# Patient Record
Sex: MALE | Race: WHITE | NOT HISPANIC OR LATINO | Employment: OTHER | URBAN - METROPOLITAN AREA
[De-identification: names, ages, dates, MRNs, and addresses within clinical notes are randomized per-mention and may not be internally consistent; named-entity substitution may affect disease eponyms.]

---

## 2021-07-20 ENCOUNTER — HOSPITAL ENCOUNTER (OUTPATIENT)
Dept: RADIOLOGY | Facility: MEDICAL CENTER | Age: 71
End: 2021-07-20
Attending: FAMILY MEDICINE
Payer: COMMERCIAL

## 2021-07-20 DIAGNOSIS — J44.89 CHRONIC OBSTRUCTIVE BRONCHITIS (HCC): ICD-10-CM

## 2021-07-20 DIAGNOSIS — C61 MALIGNANT NEOPLASM OF PROSTATE (HCC): ICD-10-CM

## 2021-07-20 DIAGNOSIS — N40.1 BENIGN LOCALIZED HYPERPLASIA OF PROSTATE WITH URINARY OBSTRUCTION: ICD-10-CM

## 2021-07-20 DIAGNOSIS — N13.8 BENIGN LOCALIZED HYPERPLASIA OF PROSTATE WITH URINARY OBSTRUCTION: ICD-10-CM

## 2021-07-20 PROCEDURE — A9552 F18 FDG: HCPCS

## 2021-10-28 ENCOUNTER — HOSPITAL ENCOUNTER (OUTPATIENT)
Dept: RADIOLOGY | Facility: MEDICAL CENTER | Age: 71
End: 2021-10-28
Payer: COMMERCIAL

## 2021-10-28 PROBLEM — C61 PROSTATE CANCER (HCC): Status: ACTIVE | Noted: 2021-10-28

## 2021-11-01 ENCOUNTER — HOSPITAL ENCOUNTER (OUTPATIENT)
Dept: RADIATION ONCOLOGY | Facility: MEDICAL CENTER | Age: 71
End: 2021-11-30
Attending: RADIOLOGY
Payer: COMMERCIAL

## 2021-11-01 VITALS
TEMPERATURE: 98.4 F | HEART RATE: 69 BPM | OXYGEN SATURATION: 96 % | SYSTOLIC BLOOD PRESSURE: 130 MMHG | DIASTOLIC BLOOD PRESSURE: 77 MMHG

## 2021-11-01 DIAGNOSIS — C61 PROSTATE CANCER (HCC): ICD-10-CM

## 2021-11-01 PROCEDURE — 99205 OFFICE O/P NEW HI 60 MIN: CPT | Performed by: RADIOLOGY

## 2021-11-01 PROCEDURE — 99214 OFFICE O/P EST MOD 30 MIN: CPT

## 2021-11-01 ASSESSMENT — PAIN SCALES - GENERAL: PAINLEVEL: NO PAIN

## 2021-11-01 NOTE — CONSULTS
RADIATION ONCOLOGY CONSULT    DATE OF SERVICE: 11/1/2021    IDENTIFICATION:   Adenocarcinoma the prostate, initial diagnosis 2018 under active surveillance, PSA of 5.02 at presentation Fernando score 3+3 equal 6 clinical T1c.  Current PSA 5.97, [place] score 4+3 equal 7 in 1 core biopsy and Fernando 3+4 equal 7 in 2 core biopsies, clinical T1c    HISTORY OF PRESENT ILLNESS: I had the pleasure of seeing Mr. Carcamo today in consultation at the request of Dr. Sanchez for his prostate cancer.  Patient is a 71-year-old gentleman whose history of prostate cancer dates back to 2018 when he had a elevation in his PSA to 5.02.  He did not have any nodularity or abnormality on digital rectal exam at that time.  He had a biopsy that showed Fernando 3+3 equal 6 disease.  He was on an active surveillance prostate clinical trial.  He remained stable with a PSA between 5-7 during this time..  His restaging staging scans had been normal.  During Covid he was lost to active surveillance and was on watchful waiting.  Unfortunately he had an abnormal scan that question the possibility of liver and bone mets.  However histologically these were found to be not malignant.  He did however undergo a repeat biopsy that now showed [place] 3+4 equal 7 disease in the right apex and right mid and Fernando 4+3 equal 7 disease in the right base.  In total he had 3 out of 12 core biopsies positive.  Therefore he is being sent for further consideration of active therapy.    PAST MEDICAL HISTORY:   Past Medical History:   Diagnosis Date   • COPD (chronic obstructive pulmonary disease) (HCC)    • Malignant neoplasm of prostate (HCC)        PAST SURGICAL HISTORY:  History reviewed. No pertinent surgical history.    CURRENT MEDICATIONS:  No current outpatient medications on file.     No current facility-administered medications for this encounter.       ALLERGIES:    Patient has no known allergies.    FAMILY HISTORY:    Family History   Problem Relation Age  of Onset   • Cancer Neg Hx        SOCIAL HISTORY:    Social History     Tobacco Use   • Smoking status: Former Smoker   • Smokeless tobacco: Never Used   • Tobacco comment: quit 4yrs ago    Substance Use Topics   • Alcohol use: Yes     Comment: beers daily   • Drug use: Not on file       Patient is retired from Work he had [employer] service  Lives with: Daughter and son-in-law in [place]    REVIEW OF SYSTEMS:  A complete review of systems was completed in patient's chart on 11/1/2021.  All are negative with relationship to this diagnosis with the exception of:  Patient states he has had lifelong increased frequency and urgency of urination and does not exhibit any new change, he did not fill out his sexual health inventory as he is not sexually active, he denies any areas of bony tenderness or deformity    PHYSICAL EXAM:    Vitals:    11/01/21 1006   BP: 130/77   Pulse: 69   Temp: 36.9 °C (98.4 °F)   SpO2: 96%   Pain Score: No pain      0= Fully active, able to carry on all pre-disease performance without restriction.    PAIN:  0  GENERAL: No apparent distress.  HEENT:  Pupils are equal, round, and reactive to light.  Extraocular muscles   are intact. Sclerae nonicteric.  Conjunctivae pink.  Oral cavity, tongue   protrudes midline.   NECK:  Supple without evidence of thyromegaly.  NODES:  No peripheral adenopathy of the neck, supraclavicular fossa or axillae   bilaterally.  LUNGS:  Clear to ascultation bilaterally   HEART:  Regular rate and rhythm.  No murmur appreciated  ABDOMEN:  Soft. No evidence of hepatosplenomegaly.  Positive bowel sounds.  RECTAL: No suspicious nodules or lesions  EXTREMITIES:  Without Edema.  NEUROLOGIC:  Cranial nerves II through XII were intact. Normal stance and gait motor and sensory grossly within normal limits    IPSS:  IN THE PAST MONTH:     1.  Incomplete Emptying: How often have you had the sensation of not emptying your bladder?  3 = About half the time    2.  Frequency: How  often have you had to urinate less than every two hours? 5 = Almost always    3.  Intermittency: How often have you found you stopped and started again several times when you urinated? 4 = More than half the time    4.  Urgency: How often have you found it difficult to postpone urination? 4 = More than half the time    5.  Weak Stream: How often have you had a weak urinary stream? 2 = Less than half the time    6.  Straining: How often have you had to strain to start urination? 1 = Less that 1 in 5 times    7.  Nocturia: How many times did you typically get up at night to urinate? 5 = 5 times    TOTAL: 24 20 - 35 = Severe    QUALITY OF LIFE DUE TO URINARY SYMPTOMS:     If you were to spend the rest of your life with your urinary condition just the way it is now, how would you feel about that? 3 = Mixed        IMPRESSION:    Adenocarcinoma the prostate, initial diagnosis 2018 under active surveillance, PSA of 5.02 at presentation [place] score 3+3 equal 6 clinical T1c.  Current PSA 5.97, [place] score 4+3 equal 7 in 1 core biopsy and [place] 3+4 equal 7 in 2 core biopsies, clinical T1c    RECOMMENDATIONS:   I discussed the diagnosis, prognosis, and treatment options over a 1 hr 5 min time period, 95% of that time dedicated to ongoing treatment management.  I discussed with the patient and his son-in-law the importance of the changes in his disease while on active surveillance.  He would now fit into the unfavorable intermediate risk grouping due to his Fernando 4+3 component.  We discussed the data presented at [facility] this last week.  The update from NRG trial 0815.  This was a trial and intermediate risk prostate cancer testing definitive radiation with or without 6 months of androgen deprivation therapy.  Although overall survival was not improved, there was improvement in prostate cancer specific survival, distant metastasis, and biochemical control.  Therefore I recommended short course androgen deprivation therapy  in combination with external beam radiation.  Next we discussed 3 various fractionation regimens for prostate cancer.  Conventional fractionation with 40 fractions, hypofractionated radiation with 26 fractions, and 5 fractions stereotactic radiosurgery.  He would be a candidate for each of these as well.  I discussed with the patient that with each step in convenience in delivery there is a slight increase in side effect profile.  The side effects have largely been relegated to the acute setting and have largely dissipated by 3-month timing.  Without any measurable difference at that time sequence.  Certainly the largest step in acute side effects does come with the stereotactic radiosurgery approach.  Given these variables as well his his distance from travel he felt that the hypofractionated 26 fraction approach would likely fit his needs.  He is also open to androgen deprivation therapy.  [facility].  He was given a simulation for next Monday at 11 AM.      We discussed the risks, benefits and side effects of treatment and the patient is amenable to treatment.  If patient has any questions or concerns, he should feel free to contact me.    Thank you for the opportunity to participate in his care.  If any questions or comments, please do not hesitate in calling.

## 2021-11-01 NOTE — CT SIMULATION
PATIENT NAME Oliver Carcamo   PRIMARY PHYSICIAN Jovanna Morales [id number]   REFERRING PHYSICIAN Bettie Sanchez M.D. 1950     Prostate cancer (HCC)  Staging form: Prostate, AJCC 8th Edition  - Clinical: Stage IIC (cT1c, cN0, cM0, PSA: 6, Grade Group: 3) - Signed by Cyril Vega M.D. on 10/28/2021  Prostate specific antigen (PSA) range: Less than 10  Crozier score: 7  Histologic grading system: 5 grade system         Treatment Planning CT Simulation      Order Questions     Question Answer Comment    Is this for a new course of treatment? Yes     Is this an Addendum? No     Implanted Device/Pacemaker No     Simulation Status Initial     Treatment Site Prostate     Laterality None     Treatment Technique IMRT     Other Technique(s) IMRT     Treatment Pattern/Frequency Daily     Concurrent Chemotherapy No     CT Technique 3D     Slice Thickness 3mm     Scan Extent Pelvis     Bowel Preparation Yes     Treatment Device(s) Vac Will     Patient Attire Gown     Patient Position Supine     Patient Orientation Head First     Arm Position On Chest     Bladder Full     Treatment Machine No preference     Treatment Image Guidance CBCT     Frequency (CBCT) Daily     Image Guidance Match PTV - Soft Tissue Prostate    Other Orders Weekly Physics Check

## 2021-11-01 NOTE — NON-PROVIDER
Patient was seen today in clinic with Dr. Vega for Consult.  Vitals signs and weight were obtained and pain assessment was completed.  Allergies and medications were reviewed with the patient.  Toxicities of treatment assessed.     Vitals/Pain:  Vitals:    11/01/21 1006   BP: 130/77   Pulse: 69   Temp: 36.9 °C (98.4 °F)   SpO2: 96%   Pain Score: No pain        Allergies:   Patient has no known allergies.    Current Medications:  No current outpatient medications on file.     No current facility-administered medications for this encounter.         PCP:  Andrew Nur, Med Ass't

## 2021-11-08 ENCOUNTER — HOSPITAL ENCOUNTER (OUTPATIENT)
Dept: RADIATION ONCOLOGY | Facility: MEDICAL CENTER | Age: 71
End: 2021-11-08
Payer: COMMERCIAL

## 2021-11-08 PROCEDURE — 77290 THER RAD SIMULAJ FIELD CPLX: CPT

## 2021-11-08 PROCEDURE — 77334 RADIATION TREATMENT AID(S): CPT

## 2021-11-08 PROCEDURE — 77334 RADIATION TREATMENT AID(S): CPT | Mod: 26 | Performed by: RADIOLOGY

## 2021-11-08 PROCEDURE — 77263 THER RADIOLOGY TX PLNG CPLX: CPT | Performed by: RADIOLOGY

## 2021-11-09 ENCOUNTER — PATIENT OUTREACH (OUTPATIENT)
Dept: OTHER | Facility: MEDICAL CENTER | Age: 71
End: 2021-11-09

## 2021-11-09 DIAGNOSIS — Z65.8 PSYCHOSOCIAL DISTRESS: ICD-10-CM

## 2021-11-10 ENCOUNTER — HOSPITAL ENCOUNTER (OUTPATIENT)
Dept: RADIOLOGY | Facility: MEDICAL CENTER | Age: 71
End: 2021-11-10
Payer: COMMERCIAL

## 2021-11-10 PROCEDURE — 77301 RADIOTHERAPY DOSE PLAN IMRT: CPT | Mod: 26 | Performed by: RADIOLOGY

## 2021-11-10 PROCEDURE — 77338 DESIGN MLC DEVICE FOR IMRT: CPT | Performed by: RADIOLOGY

## 2021-11-10 PROCEDURE — 77301 RADIOTHERAPY DOSE PLAN IMRT: CPT | Performed by: RADIOLOGY

## 2021-11-10 PROCEDURE — 77300 RADIATION THERAPY DOSE PLAN: CPT | Mod: 26 | Performed by: RADIOLOGY

## 2021-11-10 PROCEDURE — 77300 RADIATION THERAPY DOSE PLAN: CPT | Performed by: RADIOLOGY

## 2021-11-10 PROCEDURE — 77338 DESIGN MLC DEVICE FOR IMRT: CPT | Mod: 26 | Performed by: RADIOLOGY

## 2021-11-10 NOTE — PROGRESS NOTES
Call placed to patient.  Introduced self and explained role of navigator.  Pt reports understands current plan of care.  He plans to drive self, but also has additional resources for transportation if needed.  Pt asking about discount lodging. Pt aware of [facility] resources.   Reviewed lodging list as well as [facility] guest house as resource if needs to stay in [place].  Contact information provided.  Pt denies other questions or needs at this time.  Pt scored self 3/10 on oncology distress scale.  Pt saying just general stress and family he lives with can make things more stressful at times.  Pt grateful for call, contact information for navigator provided.

## 2021-11-15 ENCOUNTER — HOSPITAL ENCOUNTER (OUTPATIENT)
Dept: RADIATION ONCOLOGY | Facility: MEDICAL CENTER | Age: 71
End: 2021-11-15
Payer: COMMERCIAL

## 2021-11-15 LAB
CHEMOTHERAPY INFUSION START DATE: NORMAL
CHEMOTHERAPY RECORDS: 2.7
CHEMOTHERAPY RECORDS: 7020
CHEMOTHERAPY RECORDS: NORMAL
CHEMOTHERAPY RX CANCER: NORMAL
DATE 1ST CHEMO CANCER: NORMAL
RAD ONC ARIA COURSE LAST TREATMENT DATE: NORMAL
RAD ONC ARIA COURSE TREATMENT ELAPSED DAYS: NORMAL
RAD ONC ARIA REFERENCE POINT DOSAGE GIVEN TO DATE: 2.7
RAD ONC ARIA REFERENCE POINT DOSAGE GIVEN TO DATE: 2.8
RAD ONC ARIA REFERENCE POINT ID: NORMAL
RAD ONC ARIA REFERENCE POINT ID: NORMAL
RAD ONC ARIA REFERENCE POINT SESSION DOSAGE GIVEN: 2.7
RAD ONC ARIA REFERENCE POINT SESSION DOSAGE GIVEN: 2.8

## 2021-11-15 PROCEDURE — 77385 HCHG IMRT DELIVERY SIMPLE: CPT | Performed by: RADIOLOGY

## 2021-11-15 PROCEDURE — 77280 THER RAD SIMULAJ FIELD SMPL: CPT | Performed by: RADIOLOGY

## 2021-11-16 ENCOUNTER — HOSPITAL ENCOUNTER (OUTPATIENT)
Dept: RADIATION ONCOLOGY | Facility: MEDICAL CENTER | Age: 71
End: 2021-11-16
Payer: COMMERCIAL

## 2021-11-16 LAB
CHEMOTHERAPY INFUSION START DATE: NORMAL
CHEMOTHERAPY RECORDS: 2.7
CHEMOTHERAPY RECORDS: 7020
CHEMOTHERAPY RECORDS: NORMAL
CHEMOTHERAPY RX CANCER: NORMAL
DATE 1ST CHEMO CANCER: NORMAL
RAD ONC ARIA COURSE LAST TREATMENT DATE: NORMAL
RAD ONC ARIA COURSE TREATMENT ELAPSED DAYS: NORMAL
RAD ONC ARIA REFERENCE POINT DOSAGE GIVEN TO DATE: 5.4
RAD ONC ARIA REFERENCE POINT DOSAGE GIVEN TO DATE: 5.61
RAD ONC ARIA REFERENCE POINT ID: NORMAL
RAD ONC ARIA REFERENCE POINT ID: NORMAL
RAD ONC ARIA REFERENCE POINT SESSION DOSAGE GIVEN: 2.7
RAD ONC ARIA REFERENCE POINT SESSION DOSAGE GIVEN: 2.8

## 2021-11-16 PROCEDURE — 77014 PR CT GUIDANCE PLACEMENT RAD THERAPY FIELDS: CPT | Mod: 26 | Performed by: RADIOLOGY

## 2021-11-16 PROCEDURE — 77385 HCHG IMRT DELIVERY SIMPLE: CPT | Performed by: RADIOLOGY

## 2021-11-17 ENCOUNTER — HOSPITAL ENCOUNTER (OUTPATIENT)
Dept: RADIATION ONCOLOGY | Facility: MEDICAL CENTER | Age: 71
End: 2021-11-17
Payer: COMMERCIAL

## 2021-11-17 VITALS — OXYGEN SATURATION: 98 % | TEMPERATURE: 97.6 F | HEART RATE: 90 BPM

## 2021-11-17 LAB
CHEMOTHERAPY INFUSION START DATE: NORMAL
CHEMOTHERAPY RECORDS: 2.7
CHEMOTHERAPY RECORDS: 7020
CHEMOTHERAPY RECORDS: NORMAL
CHEMOTHERAPY RX CANCER: NORMAL
DATE 1ST CHEMO CANCER: NORMAL
RAD ONC ARIA COURSE LAST TREATMENT DATE: NORMAL
RAD ONC ARIA COURSE TREATMENT ELAPSED DAYS: NORMAL
RAD ONC ARIA REFERENCE POINT DOSAGE GIVEN TO DATE: 8.1
RAD ONC ARIA REFERENCE POINT DOSAGE GIVEN TO DATE: 8.41
RAD ONC ARIA REFERENCE POINT ID: NORMAL
RAD ONC ARIA REFERENCE POINT ID: NORMAL
RAD ONC ARIA REFERENCE POINT SESSION DOSAGE GIVEN: 2.7
RAD ONC ARIA REFERENCE POINT SESSION DOSAGE GIVEN: 2.8

## 2021-11-17 PROCEDURE — 77336 RADIATION PHYSICS CONSULT: CPT | Performed by: RADIOLOGY

## 2021-11-17 PROCEDURE — 77385 HCHG IMRT DELIVERY SIMPLE: CPT | Performed by: RADIOLOGY

## 2021-11-17 PROCEDURE — 77014 PR CT GUIDANCE PLACEMENT RAD THERAPY FIELDS: CPT | Mod: 26 | Performed by: RADIOLOGY

## 2021-11-17 ASSESSMENT — PAIN SCALES - GENERAL: PAINLEVEL: NO PAIN

## 2021-11-17 NOTE — ON TREATMENT VISIT
ON TREATMENT NOTE  RADIATION ONCOLOGY DEPARTMENT    Patient name:  Oliver Carcamo    Primary Physician:  Jovanna Morales D.O. MRN: [id number]  CSN: [id number]   Referring physician:  Bettie Sanchez M.D. : 1950, 71 y.o.     ENCOUNTER DATE:  21    DIAGNOSIS:    Prostate cancer (HCC)  Staging form: Prostate, AJCC 8th Edition  - Clinical: Stage IIC (cT1c, cN0, cM0, PSA: 6, Grade Group: 3) - Signed by Cyril Vega M.D. on 10/28/2021  Prostate specific antigen (PSA) range: Less than 10  Fernando score: 7  Histologic grading system: 5 grade system      TREATMENT SUMMARY:  [facility] Treatment Information        Some values may be hidden. Unless noted otherwise, only the newest values recorded on each date are displayed.         [facility] Treatment Summary 21   Course First Treatment Date 11/15/2021   Course Last Treatment Date 2021   Prostate Plan from Course C1_Prostate   Fraction 3 of 26   Elapsed Course Days 2 @ [id number]   Prescribed Fraction Dose 270 cGy   Prescribed Total Dose 7,020 cGy   Prostate Reference Point from Course C1_Prostate   Elapsed Course Days 2 @ [id number]   Session Dose 270 cGy   Total Dose 810 cGy   Prostate CP Reference Point from Course C1_Prostate   Elapsed Course Days 2 @ [id number]   Session Dose 280 cGy   Total Dose 841 cGy              SUBJECTIVE:   Patient is doing well.  He does not any changes he would attribute to his radiation.    VITAL SIGNS:    Encounter Vitals  Temperature: 36.4 °C (97.6 °F)  Pulse: 90  Pulse Oximetry: 98 %  Pain Score: No pain  Pain Assessment 2021   Pain Score 0 0          PHYSICAL EXAM:  Physical Exam  Genitourinary:     Comments: Normal         TOXICITY  Toxicity Assessment 2021   Toxicity Assessment Male Pelvis   Fatigue (lethargy, malaise, asthenia) None   Radiation Dermatitis None   Anorexia None   Colitis None   Constipation None   Dehydration None   Diarrhea w/o Colostomy None   Flatulence None   Nausea  None   Proctitis None   Vomiting None   RT - Pain due to RT None   Tumor Pain (onset or exacerbation of tumor pain due to treatment) None   Dysuria (painful urination) None   Urinary Frequency Hourly or more with urgency, or requiring catheter   Urinary Urgency None   Bladder Spasms Absent   Incontinence None   Urinary Retention Normal         IMPRESSION:  Cancer Staging  Prostate cancer (HCC)  Staging form: Prostate, AJCC 8th Edition  - Clinical: Stage IIC (cT1c, cN0, cM0, PSA: 6, Grade Group: 3) - Signed by Cyril Vega M.D. on 10/28/2021      PLAN:  No change in treatment plan    Disposition:  Treatment plan reviewed. Questions answered. Continue therapy outlined.     Cyril Vega M.D.    No orders of the defined types were placed in this encounter.

## 2021-11-18 ENCOUNTER — HOSPITAL ENCOUNTER (OUTPATIENT)
Dept: RADIATION ONCOLOGY | Facility: MEDICAL CENTER | Age: 71
End: 2021-11-18
Payer: COMMERCIAL

## 2021-11-18 LAB
CHEMOTHERAPY INFUSION START DATE: NORMAL
CHEMOTHERAPY RECORDS: 2.7
CHEMOTHERAPY RECORDS: 7020
CHEMOTHERAPY RECORDS: NORMAL
CHEMOTHERAPY RX CANCER: NORMAL
DATE 1ST CHEMO CANCER: NORMAL
RAD ONC ARIA COURSE LAST TREATMENT DATE: NORMAL
RAD ONC ARIA COURSE TREATMENT ELAPSED DAYS: NORMAL
RAD ONC ARIA REFERENCE POINT DOSAGE GIVEN TO DATE: 10.8
RAD ONC ARIA REFERENCE POINT DOSAGE GIVEN TO DATE: 11.21
RAD ONC ARIA REFERENCE POINT ID: NORMAL
RAD ONC ARIA REFERENCE POINT ID: NORMAL
RAD ONC ARIA REFERENCE POINT SESSION DOSAGE GIVEN: 2.7
RAD ONC ARIA REFERENCE POINT SESSION DOSAGE GIVEN: 2.8

## 2021-11-18 PROCEDURE — 77385 HCHG IMRT DELIVERY SIMPLE: CPT | Performed by: RADIOLOGY

## 2021-11-18 PROCEDURE — 77014 PR CT GUIDANCE PLACEMENT RAD THERAPY FIELDS: CPT | Mod: 26 | Performed by: RADIOLOGY

## 2021-11-19 ENCOUNTER — HOSPITAL ENCOUNTER (OUTPATIENT)
Dept: RADIATION ONCOLOGY | Facility: MEDICAL CENTER | Age: 71
End: 2021-11-19
Payer: COMMERCIAL

## 2021-11-19 LAB
CHEMOTHERAPY INFUSION START DATE: NORMAL
CHEMOTHERAPY RECORDS: 2.7
CHEMOTHERAPY RECORDS: 7020
CHEMOTHERAPY RECORDS: NORMAL
CHEMOTHERAPY RX CANCER: NORMAL
DATE 1ST CHEMO CANCER: NORMAL
RAD ONC ARIA COURSE LAST TREATMENT DATE: NORMAL
RAD ONC ARIA COURSE TREATMENT ELAPSED DAYS: NORMAL
RAD ONC ARIA REFERENCE POINT DOSAGE GIVEN TO DATE: 13.5
RAD ONC ARIA REFERENCE POINT DOSAGE GIVEN TO DATE: 14.02
RAD ONC ARIA REFERENCE POINT ID: NORMAL
RAD ONC ARIA REFERENCE POINT ID: NORMAL
RAD ONC ARIA REFERENCE POINT SESSION DOSAGE GIVEN: 2.7
RAD ONC ARIA REFERENCE POINT SESSION DOSAGE GIVEN: 2.8

## 2021-11-19 PROCEDURE — 77385 HCHG IMRT DELIVERY SIMPLE: CPT | Performed by: RADIOLOGY

## 2021-11-19 PROCEDURE — 77427 RADIATION TX MANAGEMENT X5: CPT | Performed by: RADIOLOGY

## 2021-11-19 PROCEDURE — 77014 PR CT GUIDANCE PLACEMENT RAD THERAPY FIELDS: CPT | Mod: 26 | Performed by: RADIOLOGY

## 2021-11-21 ENCOUNTER — HOSPITAL ENCOUNTER (OUTPATIENT)
Dept: RADIATION ONCOLOGY | Facility: MEDICAL CENTER | Age: 71
End: 2021-11-21
Payer: COMMERCIAL

## 2021-11-21 LAB
CHEMOTHERAPY INFUSION START DATE: NORMAL
CHEMOTHERAPY RECORDS: 2.7
CHEMOTHERAPY RECORDS: 7020
CHEMOTHERAPY RECORDS: NORMAL
CHEMOTHERAPY RX CANCER: NORMAL
DATE 1ST CHEMO CANCER: NORMAL
RAD ONC ARIA COURSE LAST TREATMENT DATE: NORMAL
RAD ONC ARIA COURSE TREATMENT ELAPSED DAYS: NORMAL
RAD ONC ARIA REFERENCE POINT DOSAGE GIVEN TO DATE: 16.2
RAD ONC ARIA REFERENCE POINT DOSAGE GIVEN TO DATE: 16.82
RAD ONC ARIA REFERENCE POINT ID: NORMAL
RAD ONC ARIA REFERENCE POINT ID: NORMAL
RAD ONC ARIA REFERENCE POINT SESSION DOSAGE GIVEN: 2.7
RAD ONC ARIA REFERENCE POINT SESSION DOSAGE GIVEN: 2.8

## 2021-11-21 PROCEDURE — 77014 PR CT GUIDANCE PLACEMENT RAD THERAPY FIELDS: CPT | Mod: 26 | Performed by: RADIOLOGY

## 2021-11-21 PROCEDURE — 77385 HCHG IMRT DELIVERY SIMPLE: CPT | Performed by: RADIOLOGY

## 2021-11-22 ENCOUNTER — HOSPITAL ENCOUNTER (OUTPATIENT)
Dept: RADIATION ONCOLOGY | Facility: MEDICAL CENTER | Age: 71
End: 2021-11-22
Payer: COMMERCIAL

## 2021-11-22 LAB
CHEMOTHERAPY INFUSION START DATE: NORMAL
CHEMOTHERAPY RECORDS: 2.7
CHEMOTHERAPY RECORDS: 7020
CHEMOTHERAPY RECORDS: NORMAL
CHEMOTHERAPY RX CANCER: NORMAL
DATE 1ST CHEMO CANCER: NORMAL
RAD ONC ARIA COURSE LAST TREATMENT DATE: NORMAL
RAD ONC ARIA COURSE TREATMENT ELAPSED DAYS: NORMAL
RAD ONC ARIA REFERENCE POINT DOSAGE GIVEN TO DATE: 18.9
RAD ONC ARIA REFERENCE POINT DOSAGE GIVEN TO DATE: 19.62
RAD ONC ARIA REFERENCE POINT ID: NORMAL
RAD ONC ARIA REFERENCE POINT ID: NORMAL
RAD ONC ARIA REFERENCE POINT SESSION DOSAGE GIVEN: 2.7
RAD ONC ARIA REFERENCE POINT SESSION DOSAGE GIVEN: 2.8

## 2021-11-22 PROCEDURE — 77014 PR CT GUIDANCE PLACEMENT RAD THERAPY FIELDS: CPT | Mod: 26 | Performed by: RADIOLOGY

## 2021-11-22 PROCEDURE — 77385 HCHG IMRT DELIVERY SIMPLE: CPT | Performed by: RADIOLOGY

## 2021-11-23 ENCOUNTER — HOSPITAL ENCOUNTER (OUTPATIENT)
Dept: RADIATION ONCOLOGY | Facility: MEDICAL CENTER | Age: 71
End: 2021-11-23
Payer: COMMERCIAL

## 2021-11-23 LAB
CHEMOTHERAPY INFUSION START DATE: NORMAL
CHEMOTHERAPY RECORDS: 2.7
CHEMOTHERAPY RECORDS: 7020
CHEMOTHERAPY RECORDS: NORMAL
CHEMOTHERAPY RX CANCER: NORMAL
DATE 1ST CHEMO CANCER: NORMAL
RAD ONC ARIA COURSE LAST TREATMENT DATE: NORMAL
RAD ONC ARIA COURSE TREATMENT ELAPSED DAYS: NORMAL
RAD ONC ARIA REFERENCE POINT DOSAGE GIVEN TO DATE: 21.6
RAD ONC ARIA REFERENCE POINT DOSAGE GIVEN TO DATE: 22.43
RAD ONC ARIA REFERENCE POINT ID: NORMAL
RAD ONC ARIA REFERENCE POINT ID: NORMAL
RAD ONC ARIA REFERENCE POINT SESSION DOSAGE GIVEN: 2.7
RAD ONC ARIA REFERENCE POINT SESSION DOSAGE GIVEN: 2.8

## 2021-11-23 PROCEDURE — 77336 RADIATION PHYSICS CONSULT: CPT | Performed by: RADIOLOGY

## 2021-11-23 PROCEDURE — 77014 PR CT GUIDANCE PLACEMENT RAD THERAPY FIELDS: CPT | Mod: 26 | Performed by: RADIOLOGY

## 2021-11-23 PROCEDURE — 77385 HCHG IMRT DELIVERY SIMPLE: CPT | Performed by: RADIOLOGY

## 2021-11-24 ENCOUNTER — HOSPITAL ENCOUNTER (OUTPATIENT)
Dept: RADIATION ONCOLOGY | Facility: MEDICAL CENTER | Age: 71
End: 2021-11-24
Payer: COMMERCIAL

## 2021-11-24 VITALS
HEART RATE: 82 BPM | OXYGEN SATURATION: 97 % | SYSTOLIC BLOOD PRESSURE: 120 MMHG | DIASTOLIC BLOOD PRESSURE: 73 MMHG | TEMPERATURE: 97.1 F

## 2021-11-24 LAB
CHEMOTHERAPY INFUSION START DATE: NORMAL
CHEMOTHERAPY RECORDS: 2.7
CHEMOTHERAPY RECORDS: 7020
CHEMOTHERAPY RECORDS: NORMAL
CHEMOTHERAPY RX CANCER: NORMAL
DATE 1ST CHEMO CANCER: NORMAL
RAD ONC ARIA COURSE LAST TREATMENT DATE: NORMAL
RAD ONC ARIA COURSE TREATMENT ELAPSED DAYS: NORMAL
RAD ONC ARIA REFERENCE POINT DOSAGE GIVEN TO DATE: 24.3
RAD ONC ARIA REFERENCE POINT DOSAGE GIVEN TO DATE: 25.23
RAD ONC ARIA REFERENCE POINT ID: NORMAL
RAD ONC ARIA REFERENCE POINT ID: NORMAL
RAD ONC ARIA REFERENCE POINT SESSION DOSAGE GIVEN: 2.7
RAD ONC ARIA REFERENCE POINT SESSION DOSAGE GIVEN: 2.8

## 2021-11-24 PROCEDURE — 77014 PR CT GUIDANCE PLACEMENT RAD THERAPY FIELDS: CPT | Mod: 26 | Performed by: RADIOLOGY

## 2021-11-24 PROCEDURE — 77385 HCHG IMRT DELIVERY SIMPLE: CPT | Performed by: RADIOLOGY

## 2021-11-24 ASSESSMENT — PAIN SCALES - GENERAL: PAINLEVEL: NO PAIN

## 2021-11-24 NOTE — ON TREATMENT VISIT
ON TREATMENT NOTE  RADIATION ONCOLOGY DEPARTMENT    Patient name:  Oliver Carcamo    Primary Physician:  Jovanna Morales D.O. MRN: [id number]  CSN: [id number]   Referring physician:  Bettie Sanchez M.D. : 1950, 71 y.o.     ENCOUNTER DATE:  21    DIAGNOSIS:    Prostate cancer (HCC)  Staging form: Prostate, AJCC 8th Edition  - Clinical: Stage IIC (cT1c, cN0, cM0, PSA: 6, Grade Group: 3) - Signed by Cyril Vega M.D. on 10/28/2021  Prostate specific antigen (PSA) range: Less than 10  Fernando score: 7  Histologic grading system: 5 grade system      TREATMENT SUMMARY:  [facility] Treatment Information        Some values may be hidden. Unless noted otherwise, only the newest values recorded on each date are displayed.         [facility] Treatment Summary 21   Course First Treatment Date 11/15/2021   Course Last Treatment Date 2021   Prostate Plan from Course C1_Prostate   Fraction    Elapsed Course Days 9 @ [id number]   Prescribed Fraction Dose 270 cGy   Prescribed Total Dose 7,020 cGy   Prostate Reference Point from Course C1_Prostate   Elapsed Course Days  @ [id number]   Session Dose 270 cGy   Total Dose 2,430 cGy   Prostate CP Reference Point from Course C1_Prostate   Elapsed Course Days  @    Session Dose 280 cGy   Total Dose 2,523 cGy              SUBJECTIVE:   Patient is doing well.  He does not have any changes he attribute to his radiation.    VITAL SIGNS:    Encounter Vitals  Temperature: 36.2 °C (97.1 °F)  Blood Pressure : 120/73  Pulse: 82  Pulse Oximetry: 97 %  Pain Score: No pain  Pain Assessment 2021   Pain Score 0 0 0          PHYSICAL EXAM:  Physical Exam  Genitourinary:     Comments: Normal         TOXICITY  Toxicity Assessment 2021   Toxicity Assessment Male Pelvis Male Pelvis   Fatigue (lethargy, malaise, asthenia) None None   Radiation Dermatitis None None   Anorexia None None   Colitis None None    Constipation None None   Dehydration None None   Diarrhea w/o Colostomy None None   Flatulence None None   Nausea None None   Proctitis None None   Vomiting None None   RT - Pain due to RT None None   Tumor Pain (onset or exacerbation of tumor pain due to treatment) None None   Dysuria (painful urination) None None   Urinary Frequency Normal Hourly or more with urgency, or requiring catheter   Urinary Urgency None None   Bladder Spasms Absent Absent   Incontinence None None   Urinary Retention Normal Normal         IMPRESSION:  Cancer Staging  Prostate cancer (HCC)  Staging form: Prostate, AJCC 8th Edition  - Clinical: Stage IIC (cT1c, cN0, cM0, PSA: 6, Grade Group: 3) - Signed by Cyril Vega M.D. on 10/28/2021      PLAN:  No change in treatment plan    Disposition:  Treatment plan reviewed. Questions answered. Continue therapy outlined.     Cyril Vega M.D.    No orders of the defined types were placed in this encounter.

## 2021-11-30 ENCOUNTER — HOSPITAL ENCOUNTER (OUTPATIENT)
Dept: RADIATION ONCOLOGY | Facility: MEDICAL CENTER | Age: 71
End: 2021-11-30
Payer: COMMERCIAL

## 2021-11-30 LAB
CHEMOTHERAPY INFUSION START DATE: NORMAL
CHEMOTHERAPY RECORDS: 2.7
CHEMOTHERAPY RECORDS: 7020
CHEMOTHERAPY RECORDS: NORMAL
CHEMOTHERAPY RX CANCER: NORMAL
DATE 1ST CHEMO CANCER: NORMAL
RAD ONC ARIA COURSE LAST TREATMENT DATE: NORMAL
RAD ONC ARIA COURSE TREATMENT ELAPSED DAYS: NORMAL
RAD ONC ARIA REFERENCE POINT DOSAGE GIVEN TO DATE: 27
RAD ONC ARIA REFERENCE POINT DOSAGE GIVEN TO DATE: 28.03
RAD ONC ARIA REFERENCE POINT ID: NORMAL
RAD ONC ARIA REFERENCE POINT ID: NORMAL
RAD ONC ARIA REFERENCE POINT SESSION DOSAGE GIVEN: 2.7
RAD ONC ARIA REFERENCE POINT SESSION DOSAGE GIVEN: 2.8

## 2021-11-30 PROCEDURE — 77385 HCHG IMRT DELIVERY SIMPLE: CPT | Performed by: RADIOLOGY

## 2021-11-30 PROCEDURE — 77427 RADIATION TX MANAGEMENT X5: CPT | Performed by: RADIOLOGY

## 2021-11-30 PROCEDURE — 77014 PR CT GUIDANCE PLACEMENT RAD THERAPY FIELDS: CPT | Mod: 26 | Performed by: RADIOLOGY

## 2021-12-01 ENCOUNTER — HOSPITAL ENCOUNTER (OUTPATIENT)
Dept: RADIATION ONCOLOGY | Facility: MEDICAL CENTER | Age: 71
End: 2021-12-01

## 2021-12-01 ENCOUNTER — HOSPITAL ENCOUNTER (OUTPATIENT)
Dept: RADIATION ONCOLOGY | Facility: MEDICAL CENTER | Age: 71
End: 2021-12-31
Attending: RADIOLOGY
Payer: COMMERCIAL

## 2021-12-01 VITALS
DIASTOLIC BLOOD PRESSURE: 83 MMHG | HEART RATE: 94 BPM | TEMPERATURE: 98.1 F | OXYGEN SATURATION: 98 % | SYSTOLIC BLOOD PRESSURE: 111 MMHG

## 2021-12-01 LAB
CHEMOTHERAPY INFUSION START DATE: NORMAL
CHEMOTHERAPY RECORDS: 2.7
CHEMOTHERAPY RECORDS: 7020
CHEMOTHERAPY RECORDS: NORMAL
CHEMOTHERAPY RX CANCER: NORMAL
DATE 1ST CHEMO CANCER: NORMAL
RAD ONC ARIA COURSE LAST TREATMENT DATE: NORMAL
RAD ONC ARIA COURSE TREATMENT ELAPSED DAYS: NORMAL
RAD ONC ARIA REFERENCE POINT DOSAGE GIVEN TO DATE: 29.7
RAD ONC ARIA REFERENCE POINT DOSAGE GIVEN TO DATE: 30.83
RAD ONC ARIA REFERENCE POINT ID: NORMAL
RAD ONC ARIA REFERENCE POINT ID: NORMAL
RAD ONC ARIA REFERENCE POINT SESSION DOSAGE GIVEN: 2.7
RAD ONC ARIA REFERENCE POINT SESSION DOSAGE GIVEN: 2.8

## 2021-12-01 PROCEDURE — 77385 HCHG IMRT DELIVERY SIMPLE: CPT | Performed by: RADIOLOGY

## 2021-12-01 PROCEDURE — 77014 PR CT GUIDANCE PLACEMENT RAD THERAPY FIELDS: CPT | Mod: 26 | Performed by: RADIOLOGY

## 2021-12-01 ASSESSMENT — PAIN SCALES - GENERAL: PAINLEVEL: NO PAIN

## 2021-12-01 NOTE — ON TREATMENT VISIT
ON TREATMENT NOTE  RADIATION ONCOLOGY DEPARTMENT    Patient name:  Oliver Carcamo    Primary Physician:  Jovanna Morales D.O. MRN: [id number]  CSN: [id number]   Referring physician:  No ref. provider found : 1950, 71 y.o.     ENCOUNTER DATE:  21    DIAGNOSIS:    Prostate cancer (HCC)  Staging form: Prostate, AJCC 8th Edition  - Clinical: Stage IIC (cT1c, cN0, cM0, PSA: 6, Grade Group: 3) - Signed by Cyril Vega M.D. on 10/28/2021  Prostate specific antigen (PSA) range: Less than 10  Fernando score: 7  Histologic grading system: 5 grade system      TREATMENT SUMMARY:  [facility] Treatment Information        Some values may be hidden. Unless noted otherwise, only the newest values recorded on each date are displayed.         [facility]ria Treatment Summary 21   Course First Treatment Date 11/15/2021   Course Last Treatment Date 2021   Prostate Plan from Course C1_Prostate   Fraction    Elapsed Course Days 16 @    Prescribed Fraction Dose 270 cGy   Prescribed Total Dose 7,020 cGy   Prostate Reference Point from Course C1_Prostate   Elapsed Course Days 16 @ [id number]   Session Dose 270 cGy   Total Dose 2,970 cGy   Prostate CP Reference Point from Course C1_Prostate   Elapsed Course Days 16 @    Session Dose 280 cGy   Total Dose 3,083 cGy              SUBJECTIVE:   Patient is doing well.  He is still experiencing slight increase in his urgency and frequency of urinary habits.  He continues on his Flomax.    VITAL SIGNS:    Encounter Vitals  Temperature: 36.7 °C (98.1 °F)  Blood Pressure : 111/83  Pulse: 94  Pulse Oximetry: 98 %  Pain Score: No pain  Pain Assessment 2021   Pain Score 0 0 0 0          PHYSICAL EXAM:  Physical Exam  Genitourinary:     Comments: Normal         TOXICITY  Toxicity Assessment 2021   Toxicity Assessment Male Pelvis Male Pelvis Male Pelvis   Fatigue (lethargy, malaise, asthenia)  None None None   Radiation Dermatitis None None None   Anorexia None None None   Colitis None None None   Constipation None None None   Dehydration None None None   Diarrhea w/o Colostomy None None None   Flatulence None None None   Nausea None None None   Proctitis None None None   Vomiting None None None   RT - Pain due to RT None None None   Tumor Pain (onset or exacerbation of tumor pain due to treatment) - None None   Dysuria (painful urination) None None None   Urinary Frequency Increase in frequency up to 2x normal Normal Hourly or more with urgency, or requiring catheter   Urinary Urgency Present None None   Bladder Spasms Absent Absent Absent   Incontinence None None None   Urinary Retention Normal Normal Normal         IMPRESSION:  Cancer Staging  Prostate cancer (HCC)  Staging form: Prostate, AJCC 8th Edition  - Clinical: Stage IIC (cT1c, cN0, cM0, PSA: 6, Grade Group: 3) - Signed by Cyril Vega M.D. on 10/28/2021      PLAN:  No change in treatment plan    Disposition:  Treatment plan reviewed. Questions answered. Continue therapy outlined.     Cyril Vega M.D.    No orders of the defined types were placed in this encounter.

## 2021-12-02 ENCOUNTER — HOSPITAL ENCOUNTER (OUTPATIENT)
Dept: RADIATION ONCOLOGY | Facility: MEDICAL CENTER | Age: 71
End: 2021-12-02
Payer: COMMERCIAL

## 2021-12-02 LAB
CHEMOTHERAPY INFUSION START DATE: NORMAL
CHEMOTHERAPY RECORDS: 2.7
CHEMOTHERAPY RECORDS: 7020
CHEMOTHERAPY RECORDS: NORMAL
CHEMOTHERAPY RX CANCER: NORMAL
DATE 1ST CHEMO CANCER: NORMAL
RAD ONC ARIA COURSE LAST TREATMENT DATE: NORMAL
RAD ONC ARIA COURSE TREATMENT ELAPSED DAYS: NORMAL
RAD ONC ARIA REFERENCE POINT DOSAGE GIVEN TO DATE: 32.4
RAD ONC ARIA REFERENCE POINT DOSAGE GIVEN TO DATE: 33.64
RAD ONC ARIA REFERENCE POINT ID: NORMAL
RAD ONC ARIA REFERENCE POINT ID: NORMAL
RAD ONC ARIA REFERENCE POINT SESSION DOSAGE GIVEN: 2.7
RAD ONC ARIA REFERENCE POINT SESSION DOSAGE GIVEN: 2.8

## 2021-12-02 PROCEDURE — 77014 PR CT GUIDANCE PLACEMENT RAD THERAPY FIELDS: CPT | Mod: 26 | Performed by: RADIOLOGY

## 2021-12-02 PROCEDURE — 77385 HCHG IMRT DELIVERY SIMPLE: CPT | Performed by: RADIOLOGY

## 2021-12-03 ENCOUNTER — HOSPITAL ENCOUNTER (OUTPATIENT)
Dept: RADIATION ONCOLOGY | Facility: MEDICAL CENTER | Age: 71
End: 2021-12-03
Payer: COMMERCIAL

## 2021-12-03 LAB
CHEMOTHERAPY INFUSION START DATE: NORMAL
CHEMOTHERAPY RECORDS: 2.7
CHEMOTHERAPY RECORDS: 7020
CHEMOTHERAPY RECORDS: NORMAL
CHEMOTHERAPY RX CANCER: NORMAL
DATE 1ST CHEMO CANCER: NORMAL
RAD ONC ARIA COURSE LAST TREATMENT DATE: NORMAL
RAD ONC ARIA COURSE TREATMENT ELAPSED DAYS: NORMAL
RAD ONC ARIA REFERENCE POINT DOSAGE GIVEN TO DATE: 35.1
RAD ONC ARIA REFERENCE POINT DOSAGE GIVEN TO DATE: 36.44
RAD ONC ARIA REFERENCE POINT ID: NORMAL
RAD ONC ARIA REFERENCE POINT ID: NORMAL
RAD ONC ARIA REFERENCE POINT SESSION DOSAGE GIVEN: 2.7
RAD ONC ARIA REFERENCE POINT SESSION DOSAGE GIVEN: 2.8

## 2021-12-03 PROCEDURE — 77385 HCHG IMRT DELIVERY SIMPLE: CPT | Performed by: RADIOLOGY

## 2021-12-03 PROCEDURE — 77336 RADIATION PHYSICS CONSULT: CPT | Performed by: RADIOLOGY

## 2021-12-03 PROCEDURE — 77014 PR CT GUIDANCE PLACEMENT RAD THERAPY FIELDS: CPT | Mod: 26 | Performed by: RADIOLOGY

## 2021-12-06 ENCOUNTER — HOSPITAL ENCOUNTER (OUTPATIENT)
Dept: RADIATION ONCOLOGY | Facility: MEDICAL CENTER | Age: 71
End: 2021-12-06
Payer: COMMERCIAL

## 2021-12-06 LAB
CHEMOTHERAPY INFUSION START DATE: NORMAL
CHEMOTHERAPY RECORDS: 2.7
CHEMOTHERAPY RECORDS: 7020
CHEMOTHERAPY RECORDS: NORMAL
CHEMOTHERAPY RX CANCER: NORMAL
DATE 1ST CHEMO CANCER: NORMAL
RAD ONC ARIA COURSE LAST TREATMENT DATE: NORMAL
RAD ONC ARIA COURSE TREATMENT ELAPSED DAYS: NORMAL
RAD ONC ARIA REFERENCE POINT DOSAGE GIVEN TO DATE: 37.8
RAD ONC ARIA REFERENCE POINT DOSAGE GIVEN TO DATE: 39.24
RAD ONC ARIA REFERENCE POINT ID: NORMAL
RAD ONC ARIA REFERENCE POINT ID: NORMAL
RAD ONC ARIA REFERENCE POINT SESSION DOSAGE GIVEN: 2.7
RAD ONC ARIA REFERENCE POINT SESSION DOSAGE GIVEN: 2.8

## 2021-12-06 PROCEDURE — 77385 HCHG IMRT DELIVERY SIMPLE: CPT | Performed by: RADIOLOGY

## 2021-12-06 PROCEDURE — 77014 PR CT GUIDANCE PLACEMENT RAD THERAPY FIELDS: CPT | Mod: 26 | Performed by: RADIOLOGY

## 2021-12-07 ENCOUNTER — HOSPITAL ENCOUNTER (OUTPATIENT)
Dept: RADIATION ONCOLOGY | Facility: MEDICAL CENTER | Age: 71
End: 2021-12-07
Payer: COMMERCIAL

## 2021-12-07 LAB
CHEMOTHERAPY INFUSION START DATE: NORMAL
CHEMOTHERAPY RECORDS: 2.7
CHEMOTHERAPY RECORDS: 7020
CHEMOTHERAPY RECORDS: NORMAL
CHEMOTHERAPY RX CANCER: NORMAL
DATE 1ST CHEMO CANCER: NORMAL
RAD ONC ARIA COURSE LAST TREATMENT DATE: NORMAL
RAD ONC ARIA COURSE TREATMENT ELAPSED DAYS: NORMAL
RAD ONC ARIA REFERENCE POINT DOSAGE GIVEN TO DATE: 40.5
RAD ONC ARIA REFERENCE POINT DOSAGE GIVEN TO DATE: 42.05
RAD ONC ARIA REFERENCE POINT ID: NORMAL
RAD ONC ARIA REFERENCE POINT ID: NORMAL
RAD ONC ARIA REFERENCE POINT SESSION DOSAGE GIVEN: 2.7
RAD ONC ARIA REFERENCE POINT SESSION DOSAGE GIVEN: 2.8

## 2021-12-07 PROCEDURE — 77427 RADIATION TX MANAGEMENT X5: CPT | Performed by: RADIOLOGY

## 2021-12-07 PROCEDURE — 77014 PR CT GUIDANCE PLACEMENT RAD THERAPY FIELDS: CPT | Mod: 26 | Performed by: RADIOLOGY

## 2021-12-07 PROCEDURE — 77385 HCHG IMRT DELIVERY SIMPLE: CPT | Performed by: RADIOLOGY

## 2021-12-08 ENCOUNTER — HOSPITAL ENCOUNTER (OUTPATIENT)
Dept: RADIATION ONCOLOGY | Facility: MEDICAL CENTER | Age: 71
End: 2021-12-08
Payer: COMMERCIAL

## 2021-12-08 VITALS
TEMPERATURE: 97.2 F | HEART RATE: 96 BPM | OXYGEN SATURATION: 97 % | DIASTOLIC BLOOD PRESSURE: 76 MMHG | SYSTOLIC BLOOD PRESSURE: 149 MMHG

## 2021-12-08 LAB
CHEMOTHERAPY INFUSION START DATE: NORMAL
CHEMOTHERAPY RECORDS: 2.7
CHEMOTHERAPY RECORDS: 7020
CHEMOTHERAPY RECORDS: NORMAL
CHEMOTHERAPY RX CANCER: NORMAL
DATE 1ST CHEMO CANCER: NORMAL
RAD ONC ARIA COURSE LAST TREATMENT DATE: NORMAL
RAD ONC ARIA COURSE TREATMENT ELAPSED DAYS: NORMAL
RAD ONC ARIA REFERENCE POINT DOSAGE GIVEN TO DATE: 43.2
RAD ONC ARIA REFERENCE POINT DOSAGE GIVEN TO DATE: 44.85
RAD ONC ARIA REFERENCE POINT ID: NORMAL
RAD ONC ARIA REFERENCE POINT ID: NORMAL
RAD ONC ARIA REFERENCE POINT SESSION DOSAGE GIVEN: 2.7
RAD ONC ARIA REFERENCE POINT SESSION DOSAGE GIVEN: 2.8

## 2021-12-08 PROCEDURE — 77385 HCHG IMRT DELIVERY SIMPLE: CPT | Performed by: RADIOLOGY

## 2021-12-08 PROCEDURE — 77014 PR CT GUIDANCE PLACEMENT RAD THERAPY FIELDS: CPT | Mod: 26 | Performed by: RADIOLOGY

## 2021-12-08 ASSESSMENT — PAIN SCALES - GENERAL: PAINLEVEL: NO PAIN

## 2021-12-08 NOTE — ON TREATMENT VISIT
ON TREATMENT NOTE  RADIATION ONCOLOGY DEPARTMENT    Patient name:  Oliver Carcamo    Primary Physician:  Jovanna Morales D.O. MRN: [id number]  CSN: [id number]   Referring physician:  No ref. provider found : 1950, 71 y.o.     ENCOUNTER DATE:  21    DIAGNOSIS:    Prostate cancer (HCC)  Staging form: Prostate, AJCC 8th Edition  - Clinical: Stage IIC (cT1c, cN0, cM0, PSA: 6, Grade Group: 3) - Signed by Cyril Vega M.D. on 10/28/2021  Prostate specific antigen (PSA) range: Less than 10  Fernando score: 7  Histologic grading system: 5 grade system      TREATMENT SUMMARY:  [facility] Treatment Information        Some values may be hidden. Unless noted otherwise, only the newest values recorded on each date are displayed.         [facility] Treatment Summary 21   Course First Treatment Date 11/15/2021   Course Last Treatment Date 2021   Prostate Plan from Course C1_Prostate   Fraction 16 of 26   Elapsed Course Days  @ [id number]   Prescribed Fraction Dose 270 cGy   Prescribed Total Dose 7,020 cGy   Prostate Reference Point from Course C1_Prostate   Elapsed Course Days  @ [id number]   Session Dose 270 cGy   Total Dose 4,320 cGy   Prostate CP Reference Point from Course C1_Prostate   Elapsed Course Days  @ [id number]   Session Dose 280 cGy   Total Dose 4,485 cGy              SUBJECTIVE:   Fortunately we were able to identify the root of patient's increased bowel habits.  He did not discontinue his MiraLAX daily and in fact feels he forgets some days and even takes it twice.  Therefore we have discontinued his MiraLAX which should improve his bowel symptomology.    VITAL SIGNS:    Encounter Vitals  Temperature: 36.2 °C (97.2 °F)  Blood Pressure : 149/76  Pulse: 96  Pulse Oximetry: 97 %  Pain Score: No pain  Pain Assessment 2021   Pain Score 0 0 0 0 0          PHYSICAL EXAM:  Normal genitourinary    TOXICITY  Toxicity Assessment 2021  12/1/2021 11/24/2021 11/17/2021   Toxicity Assessment Male Pelvis Male Pelvis Male Pelvis Male Pelvis   Fatigue (lethargy, malaise, asthenia) Increased fatigue over baseline, but not altering normal activities None None None   Radiation Dermatitis None None None None   Anorexia Loss of appetite None None None   Colitis None None None None   Constipation None None None None   Dehydration None None None None   Diarrhea w/o Colostomy Increase of 4-6 stools/day, or nocternal stools None None None   Flatulence Mild None None None   Nausea None None None None   Proctitis None None None None   Vomiting None None None None   RT - Pain due to RT None None None None   Tumor Pain (onset or exacerbation of tumor pain due to treatment) None - None None   Dysuria (painful urination) Mild symptoms requiring no intervention None None None   Urinary Frequency Increase in frequency up to 2x normal Increase in frequency up to 2x normal Normal Hourly or more with urgency, or requiring catheter   Urinary Urgency Present Present None None   Bladder Spasms Mild symptoms, not requiring intervention Absent Absent Absent   Incontinence With coughing, sneezing, etc. None None None   Urinary Retention Hesitency or dribbling, but no significant residual urine and/or retention occuring during the immediate postoperative period Normal Normal Normal         IMPRESSION:  Cancer Staging  Prostate cancer (HCC)  Staging form: Prostate, AJCC 8th Edition  - Clinical: Stage IIC (cT1c, cN0, cM0, PSA: 6, Grade Group: 3) - Signed by Cyril Vega M.D. on 10/28/2021      PLAN:  No change in treatment plan    Disposition:  Treatment plan reviewed. Questions answered. Continue therapy outlined.     Cyril Vega M.D.    No orders of the defined types were placed in this encounter.

## 2021-12-09 ENCOUNTER — HOSPITAL ENCOUNTER (OUTPATIENT)
Dept: RADIATION ONCOLOGY | Facility: MEDICAL CENTER | Age: 71
End: 2021-12-09
Payer: COMMERCIAL

## 2021-12-09 LAB
CHEMOTHERAPY INFUSION START DATE: NORMAL
CHEMOTHERAPY RECORDS: 2.7
CHEMOTHERAPY RECORDS: 7020
CHEMOTHERAPY RECORDS: NORMAL
CHEMOTHERAPY RX CANCER: NORMAL
DATE 1ST CHEMO CANCER: NORMAL
RAD ONC ARIA COURSE LAST TREATMENT DATE: NORMAL
RAD ONC ARIA COURSE TREATMENT ELAPSED DAYS: NORMAL
RAD ONC ARIA REFERENCE POINT DOSAGE GIVEN TO DATE: 45.9
RAD ONC ARIA REFERENCE POINT DOSAGE GIVEN TO DATE: 47.65
RAD ONC ARIA REFERENCE POINT ID: NORMAL
RAD ONC ARIA REFERENCE POINT ID: NORMAL
RAD ONC ARIA REFERENCE POINT SESSION DOSAGE GIVEN: 2.7
RAD ONC ARIA REFERENCE POINT SESSION DOSAGE GIVEN: 2.8

## 2021-12-09 PROCEDURE — 77014 PR CT GUIDANCE PLACEMENT RAD THERAPY FIELDS: CPT | Mod: 26 | Performed by: RADIOLOGY

## 2021-12-09 PROCEDURE — 77385 HCHG IMRT DELIVERY SIMPLE: CPT | Performed by: RADIOLOGY

## 2021-12-10 ENCOUNTER — HOSPITAL ENCOUNTER (OUTPATIENT)
Dept: RADIATION ONCOLOGY | Facility: MEDICAL CENTER | Age: 71
End: 2021-12-10
Payer: COMMERCIAL

## 2021-12-10 LAB
CHEMOTHERAPY INFUSION START DATE: NORMAL
CHEMOTHERAPY RECORDS: 2.7
CHEMOTHERAPY RECORDS: 7020
CHEMOTHERAPY RECORDS: NORMAL
CHEMOTHERAPY RX CANCER: NORMAL
DATE 1ST CHEMO CANCER: NORMAL
RAD ONC ARIA COURSE LAST TREATMENT DATE: NORMAL
RAD ONC ARIA COURSE TREATMENT ELAPSED DAYS: NORMAL
RAD ONC ARIA REFERENCE POINT DOSAGE GIVEN TO DATE: 48.6
RAD ONC ARIA REFERENCE POINT DOSAGE GIVEN TO DATE: 50.46
RAD ONC ARIA REFERENCE POINT ID: NORMAL
RAD ONC ARIA REFERENCE POINT ID: NORMAL
RAD ONC ARIA REFERENCE POINT SESSION DOSAGE GIVEN: 2.7
RAD ONC ARIA REFERENCE POINT SESSION DOSAGE GIVEN: 2.8

## 2021-12-10 PROCEDURE — 77385 HCHG IMRT DELIVERY SIMPLE: CPT | Performed by: RADIOLOGY

## 2021-12-10 PROCEDURE — 77014 PR CT GUIDANCE PLACEMENT RAD THERAPY FIELDS: CPT | Mod: 26 | Performed by: RADIOLOGY

## 2021-12-12 PROCEDURE — 77336 RADIATION PHYSICS CONSULT: CPT | Performed by: RADIOLOGY

## 2021-12-13 ENCOUNTER — HOSPITAL ENCOUNTER (OUTPATIENT)
Dept: RADIATION ONCOLOGY | Facility: MEDICAL CENTER | Age: 71
End: 2021-12-13
Payer: COMMERCIAL

## 2021-12-13 LAB
CHEMOTHERAPY INFUSION START DATE: NORMAL
CHEMOTHERAPY RECORDS: 2.7
CHEMOTHERAPY RECORDS: 7020
CHEMOTHERAPY RECORDS: NORMAL
CHEMOTHERAPY RX CANCER: NORMAL
DATE 1ST CHEMO CANCER: NORMAL
RAD ONC ARIA COURSE LAST TREATMENT DATE: NORMAL
RAD ONC ARIA COURSE TREATMENT ELAPSED DAYS: NORMAL
RAD ONC ARIA REFERENCE POINT DOSAGE GIVEN TO DATE: 51.3
RAD ONC ARIA REFERENCE POINT DOSAGE GIVEN TO DATE: 53.26
RAD ONC ARIA REFERENCE POINT ID: NORMAL
RAD ONC ARIA REFERENCE POINT ID: NORMAL
RAD ONC ARIA REFERENCE POINT SESSION DOSAGE GIVEN: 2.7
RAD ONC ARIA REFERENCE POINT SESSION DOSAGE GIVEN: 2.8

## 2021-12-13 PROCEDURE — 77014 PR CT GUIDANCE PLACEMENT RAD THERAPY FIELDS: CPT | Mod: 26 | Performed by: RADIOLOGY

## 2021-12-13 PROCEDURE — 77385 HCHG IMRT DELIVERY SIMPLE: CPT | Performed by: RADIOLOGY

## 2021-12-14 ENCOUNTER — HOSPITAL ENCOUNTER (OUTPATIENT)
Dept: RADIATION ONCOLOGY | Facility: MEDICAL CENTER | Age: 71
End: 2021-12-14
Payer: COMMERCIAL

## 2021-12-14 LAB
CHEMOTHERAPY INFUSION START DATE: NORMAL
CHEMOTHERAPY RECORDS: 2.7
CHEMOTHERAPY RECORDS: 7020
CHEMOTHERAPY RECORDS: NORMAL
CHEMOTHERAPY RX CANCER: NORMAL
DATE 1ST CHEMO CANCER: NORMAL
RAD ONC ARIA COURSE LAST TREATMENT DATE: NORMAL
RAD ONC ARIA COURSE TREATMENT ELAPSED DAYS: NORMAL
RAD ONC ARIA REFERENCE POINT DOSAGE GIVEN TO DATE: 54
RAD ONC ARIA REFERENCE POINT DOSAGE GIVEN TO DATE: 56.06
RAD ONC ARIA REFERENCE POINT ID: NORMAL
RAD ONC ARIA REFERENCE POINT ID: NORMAL
RAD ONC ARIA REFERENCE POINT SESSION DOSAGE GIVEN: 2.7
RAD ONC ARIA REFERENCE POINT SESSION DOSAGE GIVEN: 2.8

## 2021-12-14 PROCEDURE — 77014 PR CT GUIDANCE PLACEMENT RAD THERAPY FIELDS: CPT | Mod: 26 | Performed by: RADIOLOGY

## 2021-12-14 PROCEDURE — 77427 RADIATION TX MANAGEMENT X5: CPT | Performed by: RADIOLOGY

## 2021-12-14 PROCEDURE — 77385 HCHG IMRT DELIVERY SIMPLE: CPT | Performed by: RADIOLOGY

## 2021-12-15 ENCOUNTER — HOSPITAL ENCOUNTER (OUTPATIENT)
Dept: RADIATION ONCOLOGY | Facility: MEDICAL CENTER | Age: 71
End: 2021-12-15
Payer: COMMERCIAL

## 2021-12-15 VITALS
HEART RATE: 86 BPM | SYSTOLIC BLOOD PRESSURE: 132 MMHG | OXYGEN SATURATION: 97 % | TEMPERATURE: 97.7 F | DIASTOLIC BLOOD PRESSURE: 75 MMHG

## 2021-12-15 LAB
CHEMOTHERAPY INFUSION START DATE: NORMAL
CHEMOTHERAPY RECORDS: 2.7
CHEMOTHERAPY RECORDS: 7020
CHEMOTHERAPY RECORDS: NORMAL
CHEMOTHERAPY RX CANCER: NORMAL
DATE 1ST CHEMO CANCER: NORMAL
RAD ONC ARIA COURSE LAST TREATMENT DATE: NORMAL
RAD ONC ARIA COURSE TREATMENT ELAPSED DAYS: NORMAL
RAD ONC ARIA REFERENCE POINT DOSAGE GIVEN TO DATE: 56.7
RAD ONC ARIA REFERENCE POINT DOSAGE GIVEN TO DATE: 58.87
RAD ONC ARIA REFERENCE POINT ID: NORMAL
RAD ONC ARIA REFERENCE POINT ID: NORMAL
RAD ONC ARIA REFERENCE POINT SESSION DOSAGE GIVEN: 2.7
RAD ONC ARIA REFERENCE POINT SESSION DOSAGE GIVEN: 2.8

## 2021-12-15 PROCEDURE — 77014 PR CT GUIDANCE PLACEMENT RAD THERAPY FIELDS: CPT | Mod: 26 | Performed by: RADIOLOGY

## 2021-12-15 PROCEDURE — 77385 HCHG IMRT DELIVERY SIMPLE: CPT | Performed by: RADIOLOGY

## 2021-12-15 ASSESSMENT — PAIN SCALES - GENERAL: PAINLEVEL: NO PAIN

## 2021-12-15 NOTE — ON TREATMENT VISIT
ON TREATMENT NOTE  RADIATION ONCOLOGY DEPARTMENT    Patient name:  Oliver Carcamo    Primary Physician:  Jovanna Morales D.O. MRN: [id number]  CSN: [id number]   Referring physician:  No ref. provider found : 1950, 71 y.o.     ENCOUNTER DATE:  12/15/21    DIAGNOSIS:    Prostate cancer (HCC)  Staging form: Prostate, AJCC 8th Edition  - Clinical: Stage IIC (cT1c, cN0, cM0, PSA: 6, Grade Group: 3) - Signed by Cyril Vega M.D. on 10/28/2021  Prostate specific antigen (PSA) range: Less than 10  Fernando score: 7  Histologic grading system: 5 grade system      TREATMENT SUMMARY:  [facility] Treatment Information        Some values may be hidden. Unless noted otherwise, only the newest values recorded on each date are displayed.         [facility] Treatment Summary 12/15/21   Course First Treatment Date 11/15/2021   Course Last Treatment Date 12/15/2021   Prostate Plan from Course C1_Prostate   Fraction    Elapsed Course Days  @ [id number]   Prescribed Fraction Dose 270 cGy   Prescribed Total Dose 7,020 cGy   Prostate Reference Point from Course C1_Prostate   Elapsed Course Days  @ [id number]   Session Dose 270 cGy   Total Dose 5,670 cGy   Prostate CP Reference Point from Course C1_Prostate   Elapsed Course Days  @ [id number]   Session Dose 280 cGy   Total Dose 5,887 cGy              SUBJECTIVE:   Patient states by discontinuing his bowel preparation his bowels have returned to baseline.  He still has some expected increased urinary frequency and urgency.    VITAL SIGNS:    Encounter Vitals  Temperature: 36.5 °C (97.7 °F)  Blood Pressure : 132/75  Pulse: 86  Pulse Oximetry: 97 %  Pain Score: No pain  Pain Assessment 12/15/2021 2021 2021 2021 2021 2021   Pain Score 0 0 0 0 0 0   Some recent data might be hidden          PHYSICAL EXAM:  Physical Exam  Genitourinary:     Comments: Normal         TOXICITY  Toxicity Assessment 12/15/2021 2021 2021 2021  11/17/2021   Toxicity Assessment Male Pelvis Male Pelvis Male Pelvis Male Pelvis Male Pelvis   Fatigue (lethargy, malaise, asthenia) Increased fatigue over baseline, but not altering normal activities Increased fatigue over baseline, but not altering normal activities None None None   Radiation Dermatitis None None None None None   Anorexia None Loss of appetite None None None   Colitis None None None None None   Constipation None None None None None   Dehydration None None None None None   Diarrhea w/o Colostomy None Increase of 4-6 stools/day, or nocternal stools None None None   Flatulence None Mild None None None   Nausea None None None None None   Proctitis None None None None None   Vomiting None None None None None   RT - Pain due to RT None None None None None   Tumor Pain (onset or exacerbation of tumor pain due to treatment) None None - None None   Dysuria (painful urination) None Mild symptoms requiring no intervention None None None   Urinary Frequency Normal Increase in frequency up to 2x normal Increase in frequency up to 2x normal Normal Hourly or more with urgency, or requiring catheter   Urinary Urgency None Present Present None None   Bladder Spasms Absent Mild symptoms, not requiring intervention Absent Absent Absent   Incontinence None With coughing, sneezing, etc. None None None   Urinary Retention Normal Hesitency or dribbling, but no significant residual urine and/or retention occuring during the immediate postoperative period Normal Normal Normal         IMPRESSION:  Cancer Staging  Prostate cancer (HCC)  Staging form: Prostate, AJCC 8th Edition  - Clinical: Stage IIC (cT1c, cN0, cM0, PSA: 6, Grade Group: 3) - Signed by Cyril Vega M.D. on 10/28/2021      PLAN:  No change in treatment plan    Disposition:  Treatment plan reviewed. Questions answered. Continue therapy outlined.     Cyril Vega M.D.    No orders of the defined types were placed in this  encounter.

## 2021-12-16 ENCOUNTER — HOSPITAL ENCOUNTER (OUTPATIENT)
Dept: RADIATION ONCOLOGY | Facility: MEDICAL CENTER | Age: 71
End: 2021-12-16
Payer: COMMERCIAL

## 2021-12-16 LAB
CHEMOTHERAPY INFUSION START DATE: NORMAL
CHEMOTHERAPY RECORDS: 2.7
CHEMOTHERAPY RECORDS: 7020
CHEMOTHERAPY RECORDS: NORMAL
CHEMOTHERAPY RX CANCER: NORMAL
DATE 1ST CHEMO CANCER: NORMAL
RAD ONC ARIA COURSE LAST TREATMENT DATE: NORMAL
RAD ONC ARIA COURSE TREATMENT ELAPSED DAYS: NORMAL
RAD ONC ARIA REFERENCE POINT DOSAGE GIVEN TO DATE: 59.4
RAD ONC ARIA REFERENCE POINT DOSAGE GIVEN TO DATE: 61.67
RAD ONC ARIA REFERENCE POINT ID: NORMAL
RAD ONC ARIA REFERENCE POINT ID: NORMAL
RAD ONC ARIA REFERENCE POINT SESSION DOSAGE GIVEN: 2.7
RAD ONC ARIA REFERENCE POINT SESSION DOSAGE GIVEN: 2.8

## 2021-12-16 PROCEDURE — 77385 HCHG IMRT DELIVERY SIMPLE: CPT | Performed by: RADIOLOGY

## 2021-12-16 PROCEDURE — 77014 PR CT GUIDANCE PLACEMENT RAD THERAPY FIELDS: CPT | Mod: 26 | Performed by: RADIOLOGY

## 2021-12-17 ENCOUNTER — HOSPITAL ENCOUNTER (OUTPATIENT)
Dept: RADIATION ONCOLOGY | Facility: MEDICAL CENTER | Age: 71
End: 2021-12-17
Payer: COMMERCIAL

## 2021-12-17 LAB
CHEMOTHERAPY INFUSION START DATE: NORMAL
CHEMOTHERAPY RECORDS: 2.7
CHEMOTHERAPY RECORDS: 7020
CHEMOTHERAPY RECORDS: NORMAL
CHEMOTHERAPY RX CANCER: NORMAL
DATE 1ST CHEMO CANCER: NORMAL
RAD ONC ARIA COURSE LAST TREATMENT DATE: NORMAL
RAD ONC ARIA COURSE TREATMENT ELAPSED DAYS: NORMAL
RAD ONC ARIA REFERENCE POINT DOSAGE GIVEN TO DATE: 62.1
RAD ONC ARIA REFERENCE POINT DOSAGE GIVEN TO DATE: 64.47
RAD ONC ARIA REFERENCE POINT ID: NORMAL
RAD ONC ARIA REFERENCE POINT ID: NORMAL
RAD ONC ARIA REFERENCE POINT SESSION DOSAGE GIVEN: 2.7
RAD ONC ARIA REFERENCE POINT SESSION DOSAGE GIVEN: 2.8

## 2021-12-17 PROCEDURE — 77336 RADIATION PHYSICS CONSULT: CPT | Performed by: RADIOLOGY

## 2021-12-17 PROCEDURE — 77014 PR CT GUIDANCE PLACEMENT RAD THERAPY FIELDS: CPT | Mod: 26 | Performed by: RADIOLOGY

## 2021-12-17 PROCEDURE — 77385 HCHG IMRT DELIVERY SIMPLE: CPT | Performed by: RADIOLOGY

## 2021-12-20 ENCOUNTER — HOSPITAL ENCOUNTER (OUTPATIENT)
Dept: RADIATION ONCOLOGY | Facility: MEDICAL CENTER | Age: 71
End: 2021-12-20
Payer: COMMERCIAL

## 2021-12-20 LAB
CHEMOTHERAPY INFUSION START DATE: NORMAL
CHEMOTHERAPY RECORDS: 2.7
CHEMOTHERAPY RECORDS: 7020
CHEMOTHERAPY RECORDS: NORMAL
CHEMOTHERAPY RX CANCER: NORMAL
DATE 1ST CHEMO CANCER: NORMAL
RAD ONC ARIA COURSE LAST TREATMENT DATE: NORMAL
RAD ONC ARIA COURSE TREATMENT ELAPSED DAYS: NORMAL
RAD ONC ARIA REFERENCE POINT DOSAGE GIVEN TO DATE: 64.8
RAD ONC ARIA REFERENCE POINT DOSAGE GIVEN TO DATE: 67.28
RAD ONC ARIA REFERENCE POINT ID: NORMAL
RAD ONC ARIA REFERENCE POINT ID: NORMAL
RAD ONC ARIA REFERENCE POINT SESSION DOSAGE GIVEN: 2.7
RAD ONC ARIA REFERENCE POINT SESSION DOSAGE GIVEN: 2.8

## 2021-12-20 PROCEDURE — 77014 PR CT GUIDANCE PLACEMENT RAD THERAPY FIELDS: CPT | Mod: 26 | Performed by: RADIOLOGY

## 2021-12-20 PROCEDURE — 77385 HCHG IMRT DELIVERY SIMPLE: CPT | Performed by: RADIOLOGY

## 2021-12-21 ENCOUNTER — HOSPITAL ENCOUNTER (OUTPATIENT)
Dept: RADIATION ONCOLOGY | Facility: MEDICAL CENTER | Age: 71
End: 2021-12-21
Payer: COMMERCIAL

## 2021-12-21 LAB
CHEMOTHERAPY INFUSION START DATE: NORMAL
CHEMOTHERAPY RECORDS: 2.7
CHEMOTHERAPY RECORDS: 7020
CHEMOTHERAPY RECORDS: NORMAL
CHEMOTHERAPY RX CANCER: NORMAL
DATE 1ST CHEMO CANCER: NORMAL
RAD ONC ARIA COURSE LAST TREATMENT DATE: NORMAL
RAD ONC ARIA COURSE TREATMENT ELAPSED DAYS: NORMAL
RAD ONC ARIA REFERENCE POINT DOSAGE GIVEN TO DATE: 67.5
RAD ONC ARIA REFERENCE POINT DOSAGE GIVEN TO DATE: 70.08
RAD ONC ARIA REFERENCE POINT ID: NORMAL
RAD ONC ARIA REFERENCE POINT ID: NORMAL
RAD ONC ARIA REFERENCE POINT SESSION DOSAGE GIVEN: 2.7
RAD ONC ARIA REFERENCE POINT SESSION DOSAGE GIVEN: 2.8

## 2021-12-21 PROCEDURE — 77014 PR CT GUIDANCE PLACEMENT RAD THERAPY FIELDS: CPT | Mod: 26 | Performed by: RADIOLOGY

## 2021-12-21 PROCEDURE — 77385 HCHG IMRT DELIVERY SIMPLE: CPT | Performed by: RADIOLOGY

## 2021-12-21 PROCEDURE — 77427 RADIATION TX MANAGEMENT X5: CPT | Performed by: RADIOLOGY

## 2021-12-22 ENCOUNTER — HOSPITAL ENCOUNTER (OUTPATIENT)
Dept: RADIATION ONCOLOGY | Facility: MEDICAL CENTER | Age: 71
End: 2021-12-22
Payer: COMMERCIAL

## 2021-12-22 VITALS
HEART RATE: 83 BPM | DIASTOLIC BLOOD PRESSURE: 79 MMHG | TEMPERATURE: 97.8 F | OXYGEN SATURATION: 97 % | SYSTOLIC BLOOD PRESSURE: 113 MMHG

## 2021-12-22 LAB
CHEMOTHERAPY INFUSION START DATE: NORMAL
CHEMOTHERAPY INFUSION START DATE: NORMAL
CHEMOTHERAPY INFUSION STOP DATE: NORMAL
CHEMOTHERAPY RECORDS: 2.7
CHEMOTHERAPY RECORDS: 2.7
CHEMOTHERAPY RECORDS: 7020
CHEMOTHERAPY RECORDS: 7020
CHEMOTHERAPY RECORDS: NORMAL
CHEMOTHERAPY RX CANCER: NORMAL
CHEMOTHERAPY RX CANCER: NORMAL
DATE 1ST CHEMO CANCER: NORMAL
DATE 1ST CHEMO CANCER: NORMAL
RAD ONC ARIA COURSE LAST TREATMENT DATE: NORMAL
RAD ONC ARIA COURSE LAST TREATMENT DATE: NORMAL
RAD ONC ARIA COURSE TREATMENT ELAPSED DAYS: NORMAL
RAD ONC ARIA COURSE TREATMENT ELAPSED DAYS: NORMAL
RAD ONC ARIA REFERENCE POINT DOSAGE GIVEN TO DATE: 70.2
RAD ONC ARIA REFERENCE POINT DOSAGE GIVEN TO DATE: 70.2
RAD ONC ARIA REFERENCE POINT DOSAGE GIVEN TO DATE: 72.88
RAD ONC ARIA REFERENCE POINT DOSAGE GIVEN TO DATE: 72.88
RAD ONC ARIA REFERENCE POINT ID: NORMAL
RAD ONC ARIA REFERENCE POINT SESSION DOSAGE GIVEN: 2.7
RAD ONC ARIA REFERENCE POINT SESSION DOSAGE GIVEN: 2.8

## 2021-12-22 PROCEDURE — 77014 PR CT GUIDANCE PLACEMENT RAD THERAPY FIELDS: CPT | Mod: 26 | Performed by: RADIOLOGY

## 2021-12-22 PROCEDURE — 77385 HCHG IMRT DELIVERY SIMPLE: CPT | Performed by: RADIOLOGY

## 2021-12-22 ASSESSMENT — PAIN SCALES - GENERAL: PAINLEVEL: NO PAIN

## 2021-12-22 NOTE — ON TREATMENT VISIT
ON TREATMENT NOTE  RADIATION ONCOLOGY DEPARTMENT    Patient name:  Oliver Carcamo    Primary Physician:  Jovanna Morales D.O. MRN: [id number]  CSN: [id number]   Referring physician:  No ref. provider found : 1950, 71 y.o.     ENCOUNTER DATE:  21    DIAGNOSIS:    Prostate cancer (HCC)  Staging form: Prostate, AJCC 8th Edition  - Clinical: Stage IIC (cT1c, cN0, cM0, PSA: 6, Grade Group: 3) - Signed by Cyril Vega M.D. on 10/28/2021  Prostate specific antigen (PSA) range: Less than 10  Fernando score: 7  Histologic grading system: 5 grade system      TREATMENT SUMMARY:  [facility] Treatment Information        Some values may be hidden. Unless noted otherwise, only the newest values recorded on each date are displayed.         [facility] Treatment Summary 21   Course First Treatment Date 11/15/2021   Course Last Treatment Date 2021   Prostate Plan from Course C1_Prostate   Fraction  of 26   Elapsed Course Days 37 @ [id number]   Prescribed Fraction Dose 270 cGy   Prescribed Total Dose 7,020 cGy   Prostate Reference Point from Course C1_Prostate   Elapsed Course Days 37 @ [id number]   Session Dose 270 cGy   Total Dose 7,020 cGy   Prostate CP Reference Point from Course C1_Prostate   Elapsed Course Days 37 @    Session Dose 280 cGy   Total Dose 7,288 cGy              SUBJECTIVE:   Patient is doing well.  He completes treatment today.  He is having increased frequency and urgency of his bowel and bladder habits.  His bowel habits however are much improved since discontinuing his MiraLAX.    VITAL SIGNS:    Encounter Vitals  Temperature: 36.6 °C (97.8 °F)  Blood Pressure : 113/79  Pulse: 83  Pulse Oximetry: 97 %  Pain Score: No pain  Pain Assessment 2021 12/15/2021 2021 2021 2021 2021   Pain Score 0 0 0 0 0 0   Some recent data might be hidden          PHYSICAL EXAM:  Physical Exam  Genitourinary:     Comments: Normal         TOXICITY  Toxicity Assessment  12/22/2021 12/15/2021 12/8/2021 12/1/2021 11/24/2021 11/17/2021   Toxicity Assessment Breast Male Pelvis Male Pelvis Male Pelvis Male Pelvis Male Pelvis   Fatigue (lethargy, malaise, asthenia) Increased fatigue over baseline, but not altering normal activities Increased fatigue over baseline, but not altering normal activities Increased fatigue over baseline, but not altering normal activities None None None   Fever (in the absence of neutropenia) None - - - - -   Radiation Dermatitis None None None None None None   Anorexia - None Loss of appetite None None None   Colitis - None None None None None   Constipation - None None None None None   Dehydration - None None None None None   Diarrhea w/o Colostomy - None Increase of 4-6 stools/day, or nocternal stools None None None   Flatulence - None Mild None None None   Nausea - None None None None None   Proctitis - None None None None None   Vomiting - None None None None None   Lymphatics Normal - - - - -   RT - Pain due to RT None None None None None None   Tumor Pain (onset or exacerbation of tumor pain due to treatment) - None None - None None   Dysuria (painful urination) - None Mild symptoms requiring no intervention None None None   Urinary Frequency - Normal Increase in frequency up to 2x normal Increase in frequency up to 2x normal Normal Hourly or more with urgency, or requiring catheter   Urinary Urgency - None Present Present None None   Bladder Spasms - Absent Mild symptoms, not requiring intervention Absent Absent Absent   Incontinence - None With coughing, sneezing, etc. None None None   Urinary Retention - Normal Hesitency or dribbling, but no significant residual urine and/or retention occuring during the immediate postoperative period Normal Normal Normal   Dyspnea Normal - - - - -         IMPRESSION:  Cancer Staging  Prostate cancer (HCC)  Staging form: Prostate, AJCC 8th Edition  - Clinical: Stage IIC (cT1c, cN0, cM0, PSA: 6, Grade Group: 3) -  Signed by Cyril Vega M.D. on 10/28/2021      PLAN:  No change in treatment plan    Disposition:  Treatment plan reviewed. Questions answered. Continue therapy outlined.     Cyril Vega M.D.    No orders of the defined types were placed in this encounter.

## 2022-02-17 ENCOUNTER — HOSPITAL ENCOUNTER (OUTPATIENT)
Dept: RADIATION ONCOLOGY | Facility: MEDICAL CENTER | Age: 72
End: 2022-02-28
Attending: RADIOLOGY
Payer: COMMERCIAL

## 2022-02-17 NOTE — PROGRESS NOTES
Telephone Appointment Visit   As a means of avoiding spread of COVID-19, this visit is being conducted by telephone. This telephone visit was initiated by the patient and they verbally consented.    Time at start of call: 2:45    Reason for Call:  Symptom Follow-up    HPI:    Adenocarcinoma the prostate, initial diagnosis 2018 under active surveillance, PSA of 5.02 at presentation [place] score 3+3 equal 6 clinical T1c.  Current PSA 5.97, [place] score 4+3 equal 7 in 1 core biopsy and [place] 3+4 equal 7 in 2 core biopsies, clinical T1c, completing short course androgen deprivation therapy in combination with external beam radiotherapy to 7020 cGy in a moderately hypofractionated approach    Patient states he did not have any untoward effects after completing his radiation. At this point he feels he is at his baseline. He did have a PSA test at the [facility] which he states was undetectable. He is doing his next PSA in July 2022    Labs / Images Reviewed:   PSA at the [facility]    Assessment and Plan:     Per [facility] patient will now return back to the urology clinic at the [facility] for follow-up. Should he have any questions or concerns at any time he should feel free to contact me.    Follow-up: as needed    Time at end of call: 2:50  Total Time Spent: 5-10 minutes    Cyril Vega M.D.